# Patient Record
Sex: MALE | Race: OTHER | URBAN - METROPOLITAN AREA
[De-identification: names, ages, dates, MRNs, and addresses within clinical notes are randomized per-mention and may not be internally consistent; named-entity substitution may affect disease eponyms.]

---

## 2024-09-04 ENCOUNTER — EMERGENCY (EMERGENCY)
Facility: HOSPITAL | Age: 24
LOS: 0 days | Discharge: HOME | End: 2024-09-04
Attending: STUDENT IN AN ORGANIZED HEALTH CARE EDUCATION/TRAINING PROGRAM
Payer: SELF-PAY

## 2024-09-04 VITALS
HEART RATE: 59 BPM | RESPIRATION RATE: 18 BRPM | TEMPERATURE: 98 F | SYSTOLIC BLOOD PRESSURE: 98 MMHG | OXYGEN SATURATION: 98 % | DIASTOLIC BLOOD PRESSURE: 67 MMHG

## 2024-09-04 VITALS
HEART RATE: 95 BPM | TEMPERATURE: 98 F | SYSTOLIC BLOOD PRESSURE: 113 MMHG | DIASTOLIC BLOOD PRESSURE: 78 MMHG | OXYGEN SATURATION: 97 % | RESPIRATION RATE: 18 BRPM

## 2024-09-04 DIAGNOSIS — Z91.011 ALLERGY TO MILK PRODUCTS: ICD-10-CM

## 2024-09-04 DIAGNOSIS — F17.200 NICOTINE DEPENDENCE, UNSPECIFIED, UNCOMPLICATED: ICD-10-CM

## 2024-09-04 DIAGNOSIS — F19.19 OTHER PSYCHOACTIVE SUBSTANCE ABUSE WITH UNSPECIFIED PSYCHOACTIVE SUBSTANCE-INDUCED DISORDER: ICD-10-CM

## 2024-09-04 DIAGNOSIS — Z59.00 HOMELESSNESS UNSPECIFIED: ICD-10-CM

## 2024-09-04 PROCEDURE — 99283 EMERGENCY DEPT VISIT LOW MDM: CPT

## 2024-09-04 SDOH — ECONOMIC STABILITY - HOUSING INSECURITY: HOMELESSNESS UNSPECIFIED: Z59.00

## 2024-09-04 NOTE — ED ADULT NURSE NOTE - OBJECTIVE STATEMENT
22 yo male bibems after he was found sleeping on the street. GCS 15. Neuro intact, +perrl. Pt reports feeling tired and wanting to sleep. Pt has no complaints. VSS in triage. Pt denies any significant medical or surgical hx. No signs of trauma/ injury noted. Pt admits marijuana and cigarette use today. Respirations even and unlabored. 22 yo male bibems after he was found sleeping on the street this afternoon. GCS 15. Neuro intact, +perrl. Pt reports feeling tired and wanting to sleep. Pt has no complaints. VSS in triage. Pt denies any significant medical or surgical hx. No signs of trauma/ injury noted. Pt admits marijuana and cigarette use today. Respirations even and unlabored. 22 yo male bibems after he was found sleeping on the street this afternoon. GCS 15. Neuro intact, +perrl. Pt reports feeling tired and wanting to sleep. Pt has no complaints. VSS in triage. Pmhx asthma. No signs of trauma/ injury noted. Pt admits marijuana and cigarette use today. Respirations even and unlabored.

## 2024-09-04 NOTE — ED PROVIDER NOTE - ATTENDING APP SHARED VISIT CONTRIBUTION OF CARE
22yo male with no pmh presenting after being found sleeping on street.  No complaints.  Denies trauma or injuries.  Ambulating with steady gait in nad.  Asking to be left alone to sleep.  Homeless, offered SW for shelter placement.  Denies medical complaints.

## 2024-09-04 NOTE — ED PROVIDER NOTE - PATIENT PORTAL LINK FT
You can access the FollowMyHealth Patient Portal offered by [facility] by registering at the following website: [email or web address]. By joining [facility]’s FollowMyHealth portal, you will also be able to view your health information using other applications (apps) compatible with our system.

## 2024-09-04 NOTE — ED PROVIDER NOTE - PHYSICAL EXAMINATION
GEN: Awake, alert, interactive, NAD.  HEAD AND NECK: NC/AT. Airway patent. Neck supple.   EYES:  Clear b/l.  PERRL.   ENT: Moist mucus membranes.   CARDIAC: Regular rate, regular rhythm. No evident pedal edema.    RESP/CHEST: Normal respiratory effort with no use of accessory muscles or retractions. Clear throughout on auscultation.  ABD: soft, non-distended, non-tender. No rebound, no guarding.   BACK: No midline spinal TTP. No CVAT.   EXTREMITIES: Moving all extremities with no apparent deformities.   SKIN: Warm, dry, intact normal color. No rash.   NEURO: AOx3, CN II-XII grossly intact, no focal deficits.   PSYCH: Appropriate mood and affect.

## 2024-09-04 NOTE — ED PROVIDER NOTE - CLINICAL SUMMARY MEDICAL DECISION MAKING FREE TEXT BOX
24 y/o male with no PMH brought in by EMS for found sleeping in the street. vs stable.   pt denies any complaints. No signs of injuries   Pt offered shelter placement however pt declines.   Pt stable to be discharged. 22 y/o male with no PMH brought in by EMS for found sleeping in the street. vs stable.   pt denies any complaints. No signs of injuries   Pt offered shelter placement  and spoke with  [facility]. Shelter info given to patient.   Pt stable to be discharged.

## 2024-09-04 NOTE — ED PROVIDER NOTE - NSFOLLOWUPINSTRUCTIONS_ED_ALL_ED_FT
Go to [address] [address]  Rest, drink plenty of fluids.  Advance activity as tolerated.  Continue all previously prescribed medications as directed.  Follow up with your primary care physician in 48-72 hours- bring copies of your results.  Return to the ER for worsening or persistent symptoms, and/or ANY NEW OR CONCERNING SYMPTOMS. If you have issues obtaining follow up, please call: [phone] to obtain a doctor or specialist who takes your insurance in your area.  You may call [phone] to make an appointment with the internal medicine clinic.

## 2024-09-04 NOTE — ED PROVIDER NOTE - OBJECTIVE STATEMENT
22 y/o male with no PMH brought in by EMS found sleeping on the street. Pt states he was smoking marijuana and cigarettes. Pt denies any pain and states he feels fine. Pt states he just wants to sleep.

## 2024-09-04 NOTE — ED ADULT TRIAGE NOTE - CHIEF COMPLAINT QUOTE
as per EMS " found on the street sleeping, homeless, admits to smoking weed/ cigarettes," [ hx of asthma]